# Patient Record
Sex: FEMALE | Race: WHITE | Employment: UNEMPLOYED | URBAN - METROPOLITAN AREA
[De-identification: names, ages, dates, MRNs, and addresses within clinical notes are randomized per-mention and may not be internally consistent; named-entity substitution may affect disease eponyms.]

---

## 2019-12-26 ENCOUNTER — OFFICE VISIT (OUTPATIENT)
Dept: FAMILY MEDICINE CLINIC | Facility: CLINIC | Age: 44
End: 2019-12-26
Payer: COMMERCIAL

## 2019-12-26 VITALS
SYSTOLIC BLOOD PRESSURE: 138 MMHG | DIASTOLIC BLOOD PRESSURE: 87 MMHG | HEART RATE: 80 BPM | WEIGHT: 155 LBS | BODY MASS INDEX: 24.33 KG/M2 | HEIGHT: 67 IN

## 2019-12-26 DIAGNOSIS — K21.9 GASTROESOPHAGEAL REFLUX DISEASE, ESOPHAGITIS PRESENCE NOT SPECIFIED: ICD-10-CM

## 2019-12-26 DIAGNOSIS — E55.9 VITAMIN D DEFICIENCY: ICD-10-CM

## 2019-12-26 DIAGNOSIS — Z12.31 SCREENING MAMMOGRAM, ENCOUNTER FOR: Primary | ICD-10-CM

## 2019-12-26 PROCEDURE — 99202 OFFICE O/P NEW SF 15 MIN: CPT | Performed by: FAMILY MEDICINE

## 2019-12-26 PROCEDURE — 99212 OFFICE O/P EST SF 10 MIN: CPT | Performed by: FAMILY MEDICINE

## 2019-12-26 RX ORDER — OMEPRAZOLE 20 MG/1
20 CAPSULE, DELAYED RELEASE ORAL
Qty: 90 CAPSULE | Refills: 0 | Status: SHIPPED | OUTPATIENT
Start: 2019-12-26

## 2019-12-26 RX ORDER — OMEPRAZOLE 20 MG/1
20 CAPSULE, DELAYED RELEASE ORAL
COMMUNITY
End: 2020-01-23

## 2019-12-26 RX ORDER — ERGOCALCIFEROL 1.25 MG/1
CAPSULE ORAL
Refills: 2 | COMMUNITY
Start: 2019-10-25 | End: 2021-05-28 | Stop reason: ALTCHOICE

## 2019-12-26 NOTE — PROGRESS NOTES
Blood pressure 138/87, pulse 80, height 5' 7\" (1.702 m), weight 155 lb (70.3 kg). Complaining of heartburn. Had an episode of chest pain when bending over with her child few days back.   She exercises on a treadmill walking 30 minutes at a time witho

## 2019-12-26 NOTE — PATIENT INSTRUCTIONS
GERD (Adult)    The esophagus is a tube that carries food from the mouth to the stomach. A valve (the LES, lower esophageal sphincter) at the lower end of the esophagus prevents stomach acid from flowing upward.  When this valve doesn't work properly, sto · If your symptoms occur during sleep, use a foam wedge to elevate your upper body (not just your head.) Or, place 4\" blocks under the head of your bed. Or use 2 bed risers under your bedframe.   Medicines  If needed, medicines can help relieve the symptom © 0152-5531 [employer] [address]. [address] [facility] [place], [address] [place]. All rights reserved. This information is not intended as a substitute for professional medical care. Always follow your healthcare professional's instructions.         Jeannie Rodrigues Date Last Reviewed: 7/1/2016  © 9825-1485 [employer]. [address] [facility] [place], [address]. All rights reserved. This information is not intended as a substitute for professional medical care.  Always follow your healthcare professional'

## 2020-01-03 ENCOUNTER — TELEPHONE (OUTPATIENT)
Dept: FAMILY MEDICINE CLINIC | Facility: CLINIC | Age: 45
End: 2020-01-03

## 2020-01-03 DIAGNOSIS — E78.00 HIGH CHOLESTEROL: Primary | ICD-10-CM

## 2020-01-03 NOTE — TELEPHONE ENCOUNTER
Result Notes for LIPID PANEL     Notes recorded by Don Montoya on 1/3/2020 at 10:28 AM CST  Please see TE from 01/03/20.  ------    Notes recorded by Javan Horvath DO on 1/2/2020 at 2:40 PM CST  Cholesterol elevated.  Referral to dietitian x2

## 2020-01-03 NOTE — TELEPHONE ENCOUNTER
[facility]. [facility], referral is pended. Please review and sign.

## 2020-01-03 NOTE — TELEPHONE ENCOUNTER
Miguel Faust DO  [facility]             Cholesterol elevated.  Referral to dietitian x2 diagnosis high cholesterol repeat 1 year.

## 2020-01-03 NOTE — TELEPHONE ENCOUNTER
Patient calling with questions about her lipid panel results. Patient wanted to know normal range for total cholesterol, LDL cholesterol, and triglycerides. Patient given information for normal ranges of labs mentioned above.    Also reviewed patient's

## 2020-01-03 NOTE — TELEPHONE ENCOUNTER
Spoke to pt, relayed test results and recommendations for dietician. Pt expressed understanding and agrees to plan. Number to dietician provided to pt.

## 2020-01-23 ENCOUNTER — OFFICE VISIT (OUTPATIENT)
Dept: FAMILY MEDICINE CLINIC | Facility: CLINIC | Age: 45
End: 2020-01-23
Payer: COMMERCIAL

## 2020-01-23 VITALS
DIASTOLIC BLOOD PRESSURE: 86 MMHG | HEIGHT: 67 IN | SYSTOLIC BLOOD PRESSURE: 126 MMHG | WEIGHT: 155 LBS | BODY MASS INDEX: 24.33 KG/M2 | HEART RATE: 70 BPM

## 2020-01-23 DIAGNOSIS — E78.00 HIGH CHOLESTEROL: ICD-10-CM

## 2020-01-23 DIAGNOSIS — Z12.31 SCREENING MAMMOGRAM, ENCOUNTER FOR: ICD-10-CM

## 2020-01-23 DIAGNOSIS — Z00.00 ROUTINE PHYSICAL EXAMINATION: Primary | ICD-10-CM

## 2020-01-23 DIAGNOSIS — E55.9 VITAMIN D DEFICIENCY: ICD-10-CM

## 2020-01-23 PROCEDURE — 90715 TDAP VACCINE 7 YRS/> IM: CPT | Performed by: FAMILY MEDICINE

## 2020-01-23 PROCEDURE — 99396 PREV VISIT EST AGE 40-64: CPT | Performed by: FAMILY MEDICINE

## 2020-01-23 PROCEDURE — 90471 IMMUNIZATION ADMIN: CPT | Performed by: FAMILY MEDICINE

## 2020-01-23 NOTE — PROGRESS NOTES
REASON FOR VISIT:    Nadia Andrade is a 38year old female who presents for an [address]. There is no problem list on file for this patient.     General Health     Domestic Abuse: No     CAGE:           Depression Screening (PHQ-2/PHQ-9 morning before breakfast. (For stomach acid) (Patient not taking: Reported on 1/23/2020 ) 90 capsule 0      MEDICAL INFORMATION:   Past Medical History:   Diagnosis Date   • Essential hypertension     hx of  high b/s 5yrs ago       Past Surgical History: no bruits  CHEST: no chest tenderness  BREAST: deferred   LUNGS: clear to auscultation  CARDIO: RRR without murmur  GI: good BS's, no masses, HSM or tenderness  :deferred  RECTAL: deferred  MUSCULOSKELETAL: back is not tender, FROM of the back  EXTREMITI

## 2020-01-29 ENCOUNTER — TELEPHONE (OUTPATIENT)
Dept: FAMILY MEDICINE CLINIC | Facility: CLINIC | Age: 45
End: 2020-01-29

## 2020-01-29 NOTE — TELEPHONE ENCOUNTER
Please contact patient I received information from outside radiology that she has been recalled for a left sided diagnostic mammogram.  Please make sure that this mammogram follow up has been scheduled.

## 2020-01-29 NOTE — TELEPHONE ENCOUNTER
Spoke to patient and states went today for additional imaging. [facility] gave them DR. Lindsay de guzman and will be sending information to the office.

## 2020-02-07 ENCOUNTER — OFFICE VISIT (OUTPATIENT)
Dept: SURGERY | Facility: CLINIC | Age: 45
End: 2020-02-07
Payer: COMMERCIAL

## 2020-02-07 VITALS
HEIGHT: 66 IN | WEIGHT: 151.5 LBS | BODY MASS INDEX: 24.35 KG/M2 | SYSTOLIC BLOOD PRESSURE: 120 MMHG | HEART RATE: 78 BPM | DIASTOLIC BLOOD PRESSURE: 80 MMHG

## 2020-02-07 DIAGNOSIS — R63.5 WEIGHT GAIN: ICD-10-CM

## 2020-02-07 DIAGNOSIS — E78.00 HYPERCHOLESTEREMIA: Primary | ICD-10-CM

## 2020-02-07 DIAGNOSIS — E78.1 HYPERTRIGLYCERIDEMIA: ICD-10-CM

## 2020-02-07 DIAGNOSIS — E55.9 VITAMIN D DEFICIENCY: ICD-10-CM

## 2020-02-07 DIAGNOSIS — R12 HEART BURN: ICD-10-CM

## 2020-02-07 PROCEDURE — 99204 OFFICE O/P NEW MOD 45 MIN: CPT | Performed by: NURSE PRACTITIONER

## 2020-02-07 NOTE — PROGRESS NOTES
[facility] Weight [facility] Consultation Note       Date of Consult:  2020    Patient:  Christian Buchanan  :      1975  MRN:      [id number]    Referring Provider: Dr. Devika Curtis       Chief Complaint:  Patient presents with:  Consult  Weight Manag on file        Inability: Not on file      Transportation needs:        Medical: Not on file        Non-medical: Not on file    Tobacco Use      Smoking status: Never Smoker      Smokeless tobacco: Never Used    Substance and Sexual Activity      Alcohol u -  Portion sizes: -  Binge: BED 7-  Emotional: -  Depression: Total PHQ Score: 2  Grazing: +  Sweet tooth: +  Crunchy/salty: +  Etoh: Rare    Soda Drinker: No Would be very rare    Sports Drinks:  No    Juice:  No      Average daily fruit/vegetable serving Grossly normal    ASSESSMENT     HYPERCHOLESTEROLEMIA WITH HYPERTRIGLCYCERIDEMIA:  Elevated.      Lab Results   Component Value Date/Time    CHOLEST 206 (H) 12/30/2019 11:14 AM     (H) 12/30/2019 11:14 AM    HDL 53 12/30/2019 11:14 AM    TRIG 190 (H) and healthy fats, and meats/seafood without hormones, steroids, or antibiotics. Avoid processed, poor quality carbohydrates, refined grains, flour, sugar. Goals for next month:  1. Keep a food log. Meet with ND for additional support as needed.    2.

## 2020-02-07 NOTE — PATIENT INSTRUCTIONS
Nutrition:    Discussed the importance of whole food, plant powered, minimally to non-processed diet rich in fruits, vegetables, whole grains and healthy fats, and meats/seafood without hormones, steroids, or antibiotics.  Slow carbohydrates/low glycemic in

## 2021-04-27 ENCOUNTER — TELEPHONE (OUTPATIENT)
Dept: FAMILY MEDICINE CLINIC | Facility: CLINIC | Age: 46
End: 2021-04-27

## 2021-05-28 ENCOUNTER — LAB ENCOUNTER (OUTPATIENT)
Dept: LAB | Age: 46
End: 2021-05-28
Attending: FAMILY MEDICINE
Payer: COMMERCIAL

## 2021-05-28 ENCOUNTER — OFFICE VISIT (OUTPATIENT)
Dept: FAMILY MEDICINE CLINIC | Facility: CLINIC | Age: 46
End: 2021-05-28
Payer: COMMERCIAL

## 2021-05-28 VITALS
BODY MASS INDEX: 23.46 KG/M2 | DIASTOLIC BLOOD PRESSURE: 82 MMHG | SYSTOLIC BLOOD PRESSURE: 121 MMHG | HEART RATE: 78 BPM | HEIGHT: 66 IN | WEIGHT: 146 LBS

## 2021-05-28 DIAGNOSIS — Z80.0 FAMILY HISTORY OF COLON CANCER: ICD-10-CM

## 2021-05-28 DIAGNOSIS — E78.5 HYPERLIPIDEMIA, UNSPECIFIED HYPERLIPIDEMIA TYPE: ICD-10-CM

## 2021-05-28 DIAGNOSIS — Z00.00 ROUTINE PHYSICAL EXAMINATION: Primary | ICD-10-CM

## 2021-05-28 PROCEDURE — 3008F BODY MASS INDEX DOCD: CPT | Performed by: FAMILY MEDICINE

## 2021-05-28 PROCEDURE — 3074F SYST BP LT 130 MM HG: CPT | Performed by: FAMILY MEDICINE

## 2021-05-28 PROCEDURE — 80053 COMPREHEN METABOLIC PANEL: CPT

## 2021-05-28 PROCEDURE — 84443 ASSAY THYROID STIM HORMONE: CPT

## 2021-05-28 PROCEDURE — 3079F DIAST BP 80-89 MM HG: CPT | Performed by: FAMILY MEDICINE

## 2021-05-28 PROCEDURE — 99396 PREV VISIT EST AGE 40-64: CPT | Performed by: FAMILY MEDICINE

## 2021-05-28 PROCEDURE — 36415 COLL VENOUS BLD VENIPUNCTURE: CPT

## 2021-05-28 PROCEDURE — 80061 LIPID PANEL: CPT

## 2021-05-28 RX ORDER — MULTIVITAMIN
TABLET ORAL DAILY
COMMUNITY

## 2021-05-28 NOTE — PROGRESS NOTES
REASON FOR VISIT:    Angely Beatty is a 54year old female who presents for an [address].         Patient Active Problem List:     Hypercholesteremia     Weight gain     Heart burn     Vitamin D deficiency     Hypertriglyceridemia    General H Take by mouth daily. • Ascorbic Acid (VITAMIN C OR) Take by mouth daily.      • omeprazole 20 MG Oral Capsule Delayed Release Take 1 capsule (20 mg total) by mouth every morning before breakfast. (For stomach acid) (Patient not taking: Reported on 1/23/ menstrual period was 05/15/2021 (exact date).     GENERAL: well developed, well nourished, in no apparent distress   SKIN: no rashes, no suspicious lesions  HEENT: atraumatic, normocephalic, ears and throat are clear  EYES:PERRLA, EOMI, conjunctiva are yuan risk   Td/Tdap once then every 10 years   HPV Males 11-21   Zoster (Shingles) 60 and older: one dose   Varicella 2 doses if not immune   MMR 1-2 doses if born after 1957 and not immune

## 2021-08-12 ENCOUNTER — TELEPHONE (OUTPATIENT)
Dept: FAMILY MEDICINE CLINIC | Facility: CLINIC | Age: 46
End: 2021-08-12

## 2021-08-12 NOTE — TELEPHONE ENCOUNTER
Form ready for  [facility]  , pt noitifed Benzoyl Peroxide Counseling: Patient counseled that medicine may cause skin irritation and bleach clothing.  In the event of skin irritation, the patient was advised to reduce the amount of the drug applied or use it less frequently.   The patient verbalized understanding of the proper use and possible adverse effects of benzoyl peroxide.  All of the patient's questions and concerns were addressed.

## 2021-08-12 NOTE — TELEPHONE ENCOUNTER
Pt stopped by [place] office form needed for work. Please call pt when ready. Form placed in Drs folder.

## 2022-03-11 ENCOUNTER — HOSPITAL ENCOUNTER (OUTPATIENT)
Age: 47
Discharge: HOME | End: 2022-03-11
Payer: COMMERCIAL

## 2022-03-11 VITALS
RESPIRATION RATE: 17 BRPM | HEART RATE: 78 BPM | DIASTOLIC BLOOD PRESSURE: 83 MMHG | TEMPERATURE: 98 F | OXYGEN SATURATION: 99 % | SYSTOLIC BLOOD PRESSURE: 135 MMHG

## 2022-03-11 DIAGNOSIS — N93.9 VAGINAL BLEEDING: Primary | ICD-10-CM

## 2022-03-11 LAB
#MXD IC: 0.4 X10ˆ3/UL (ref 0.1–1)
B-HCG UR QL: NEGATIVE
BILIRUB UR QL STRIP: NEGATIVE
BUN BLD-MCNC: 19 MG/DL (ref 7–18)
CHLORIDE BLD-SCNC: 103 MMOL/L (ref 98–112)
CLARITY UR: CLEAR
CO2 BLD-SCNC: 27 MMOL/L (ref 21–32)
COLOR UR: YELLOW
CREAT BLD-MCNC: 0.7 MG/DL
GLUCOSE BLD-MCNC: 96 MG/DL (ref 70–99)
GLUCOSE UR STRIP-MCNC: NEGATIVE MG/DL
HCT VFR BLD AUTO: 37.2 %
HCT VFR BLD CALC: 41 %
HGB BLD-MCNC: 12.7 G/DL
ISTAT IONIZED CALCIUM FOR CHEM 8: 1.24 MMOL/L (ref 1.12–1.32)
KETONES UR STRIP-MCNC: NEGATIVE MG/DL
LEUKOCYTE ESTERASE UR QL STRIP: NEGATIVE
LYMPHOCYTES # BLD AUTO: 1.8 X10ˆ3/UL (ref 1–4)
LYMPHOCYTES NFR BLD AUTO: 25.9 %
MCH RBC QN AUTO: 30.4 PG (ref 26–34)
MCHC RBC AUTO-ENTMCNC: 34.1 G/DL (ref 31–37)
MCV RBC AUTO: 89 FL (ref 80–100)
MIXED CELL %: 6 %
NEUTROPHILS # BLD AUTO: 4.7 X10ˆ3/UL (ref 1.5–7.7)
NEUTROPHILS NFR BLD AUTO: 68.1 %
NITRITE UR QL STRIP: NEGATIVE
PH UR STRIP: 5.5 [PH]
PLATELET # BLD AUTO: 261 X10ˆ3/UL (ref 150–450)
RBC # BLD AUTO: 4.18 X10ˆ6/UL
SODIUM BLD-SCNC: 140 MMOL/L (ref 136–145)
SP GR UR STRIP: 1.01
UROBILINOGEN UR STRIP-ACNC: <2 MG/DL
WBC # BLD AUTO: 6.9 X10ˆ3/UL (ref 4–11)

## 2022-03-11 PROCEDURE — 81025 URINE PREGNANCY TEST: CPT

## 2022-03-11 PROCEDURE — 80047 BASIC METABLC PNL IONIZED CA: CPT

## 2022-03-11 PROCEDURE — 99213 OFFICE O/P EST LOW 20 MIN: CPT

## 2022-03-11 PROCEDURE — 85025 COMPLETE CBC W/AUTO DIFF WBC: CPT | Performed by: NURSE PRACTITIONER

## 2022-03-11 PROCEDURE — 36415 COLL VENOUS BLD VENIPUNCTURE: CPT

## 2022-03-11 PROCEDURE — 99203 OFFICE O/P NEW LOW 30 MIN: CPT

## 2022-03-11 PROCEDURE — 81002 URINALYSIS NONAUTO W/O SCOPE: CPT

## 2022-03-12 NOTE — ED INITIAL ASSESSMENT (HPI)
Patient reports starting mensuration on 3/2 states she is currently on the 10th day of her period. States she is bleeding heavy and has noted clots larger than a quarter. States she did not have a period in February and had a negative ucg test.  States she is currently changing her pads every 3-4 hours. Noted increasing bleeding at night and in the morning.

## 2022-04-15 ENCOUNTER — LAB ENCOUNTER (OUTPATIENT)
Dept: LAB | Age: 47
End: 2022-04-15
Attending: OBSTETRICS & GYNECOLOGY
Payer: COMMERCIAL

## 2022-04-15 DIAGNOSIS — N93.9 ABNORMAL UTERINE AND VAGINAL BLEEDING, UNSPECIFIED: Primary | ICD-10-CM

## 2022-04-15 LAB — TSI SER-ACNC: 0.9 MIU/ML (ref 0.36–3.74)

## 2022-04-15 PROCEDURE — 36415 COLL VENOUS BLD VENIPUNCTURE: CPT

## 2022-04-15 PROCEDURE — 84443 ASSAY THYROID STIM HORMONE: CPT

## 2023-04-07 ENCOUNTER — OFFICE VISIT (OUTPATIENT)
Dept: FAMILY MEDICINE CLINIC | Facility: CLINIC | Age: 48
End: 2023-04-07

## 2023-04-07 ENCOUNTER — LAB ENCOUNTER (OUTPATIENT)
Dept: LAB | Facility: REFERENCE LAB | Age: 48
End: 2023-04-07
Attending: FAMILY MEDICINE
Payer: COMMERCIAL

## 2023-04-07 VITALS
SYSTOLIC BLOOD PRESSURE: 126 MMHG | HEIGHT: 66 IN | WEIGHT: 159 LBS | BODY MASS INDEX: 25.55 KG/M2 | HEART RATE: 80 BPM | DIASTOLIC BLOOD PRESSURE: 86 MMHG

## 2023-04-07 DIAGNOSIS — Z12.31 SCREENING MAMMOGRAM, ENCOUNTER FOR: ICD-10-CM

## 2023-04-07 DIAGNOSIS — E78.00 HYPERCHOLESTEREMIA: ICD-10-CM

## 2023-04-07 DIAGNOSIS — Z12.11 ENCOUNTER FOR SCREENING COLONOSCOPY: ICD-10-CM

## 2023-04-07 DIAGNOSIS — E55.9 VITAMIN D DEFICIENCY: ICD-10-CM

## 2023-04-07 DIAGNOSIS — Z00.00 ROUTINE PHYSICAL EXAMINATION: Primary | ICD-10-CM

## 2023-04-07 LAB
ALBUMIN SERPL-MCNC: 4 G/DL (ref 3.4–5)
ALBUMIN/GLOB SERPL: 1.1 {RATIO} (ref 1–2)
ALP LIVER SERPL-CCNC: 72 U/L
ALT SERPL-CCNC: 32 U/L
ANION GAP SERPL CALC-SCNC: 7 MMOL/L (ref 0–18)
AST SERPL-CCNC: 17 U/L (ref 15–37)
BILIRUB SERPL-MCNC: 0.8 MG/DL (ref 0.1–2)
BUN BLD-MCNC: 14 MG/DL (ref 7–18)
BUN/CREAT SERPL: 17.9 (ref 10–20)
CALCIUM BLD-MCNC: 10 MG/DL (ref 8.5–10.1)
CHLORIDE SERPL-SCNC: 104 MMOL/L (ref 98–112)
CHOLEST SERPL-MCNC: 201 MG/DL (ref ?–200)
CO2 SERPL-SCNC: 27 MMOL/L (ref 21–32)
CREAT BLD-MCNC: 0.78 MG/DL
FASTING PATIENT LIPID ANSWER: YES
FASTING STATUS PATIENT QL REPORTED: YES
GFR SERPLBLD BASED ON 1.73 SQ M-ARVRAT: 94 ML/MIN/1.73M2 (ref 60–?)
GLOBULIN PLAS-MCNC: 3.7 G/DL (ref 2.8–4.4)
GLUCOSE BLD-MCNC: 86 MG/DL (ref 70–99)
HDLC SERPL-MCNC: 52 MG/DL (ref 40–59)
LDLC SERPL CALC-MCNC: 131 MG/DL (ref ?–100)
NONHDLC SERPL-MCNC: 149 MG/DL (ref ?–130)
OSMOLALITY SERPL CALC.SUM OF ELEC: 286 MOSM/KG (ref 275–295)
POTASSIUM SERPL-SCNC: 3.8 MMOL/L (ref 3.5–5.1)
PROT SERPL-MCNC: 7.7 G/DL (ref 6.4–8.2)
SODIUM SERPL-SCNC: 138 MMOL/L (ref 136–145)
TRIGL SERPL-MCNC: 98 MG/DL (ref 30–149)
TSI SER-ACNC: 1.04 MIU/ML (ref 0.36–3.74)
VIT D+METAB SERPL-MCNC: 41.9 NG/ML (ref 30–100)
VLDLC SERPL CALC-MCNC: 18 MG/DL (ref 0–30)

## 2023-04-07 PROCEDURE — 84443 ASSAY THYROID STIM HORMONE: CPT

## 2023-04-07 PROCEDURE — 80053 COMPREHEN METABOLIC PANEL: CPT

## 2023-04-07 PROCEDURE — 3074F SYST BP LT 130 MM HG: CPT | Performed by: FAMILY MEDICINE

## 2023-04-07 PROCEDURE — 3079F DIAST BP 80-89 MM HG: CPT | Performed by: FAMILY MEDICINE

## 2023-04-07 PROCEDURE — 36415 COLL VENOUS BLD VENIPUNCTURE: CPT

## 2023-04-07 PROCEDURE — 82306 VITAMIN D 25 HYDROXY: CPT

## 2023-04-07 PROCEDURE — 99396 PREV VISIT EST AGE 40-64: CPT | Performed by: FAMILY MEDICINE

## 2023-04-07 PROCEDURE — 3008F BODY MASS INDEX DOCD: CPT | Performed by: FAMILY MEDICINE

## 2023-04-07 PROCEDURE — 80061 LIPID PANEL: CPT

## 2023-04-07 RX ORDER — IBUPROFEN 800 MG/1
800 TABLET ORAL 3 TIMES DAILY PRN
COMMUNITY
Start: 2023-03-22 | End: 2023-04-07

## 2023-04-07 RX ORDER — MEDROXYPROGESTERONE ACETATE 10 MG/1
TABLET ORAL
COMMUNITY

## 2023-04-07 RX ORDER — LEVONORGESTREL 52 MG/1
INTRAUTERINE DEVICE INTRAUTERINE
COMMUNITY

## 2023-04-19 ENCOUNTER — TELEPHONE (OUTPATIENT)
Dept: FAMILY MEDICINE CLINIC | Facility: CLINIC | Age: 48
End: 2023-04-19

## 2023-04-21 ENCOUNTER — HOSPITAL ENCOUNTER (OUTPATIENT)
Dept: CT IMAGING | Age: 48
Discharge: HOME | End: 2023-04-21
Attending: FAMILY MEDICINE

## 2023-04-21 DIAGNOSIS — E78.00 HYPERCHOLESTEREMIA: ICD-10-CM

## 2023-08-03 ENCOUNTER — MED REC SCAN ONLY (OUTPATIENT)
Dept: FAMILY MEDICINE CLINIC | Facility: CLINIC | Age: 48
End: 2023-08-03

## 2025-06-16 ENCOUNTER — OFFICE VISIT (OUTPATIENT)
Dept: FAMILY MEDICINE CLINIC | Facility: CLINIC | Age: 50
End: 2025-06-16
Payer: COMMERCIAL

## 2025-06-16 ENCOUNTER — LAB ENCOUNTER (OUTPATIENT)
Dept: LAB | Age: 50
End: 2025-06-16
Attending: PHYSICIAN ASSISTANT
Payer: COMMERCIAL

## 2025-06-16 VITALS
WEIGHT: 162 LBS | HEIGHT: 66 IN | DIASTOLIC BLOOD PRESSURE: 82 MMHG | BODY MASS INDEX: 26.03 KG/M2 | SYSTOLIC BLOOD PRESSURE: 124 MMHG | HEART RATE: 73 BPM

## 2025-06-16 DIAGNOSIS — Z12.11 SCREENING FOR MALIGNANT NEOPLASM OF COLON: ICD-10-CM

## 2025-06-16 DIAGNOSIS — Z00.00 WELLNESS EXAMINATION: Primary | ICD-10-CM

## 2025-06-16 DIAGNOSIS — Z12.31 ENCOUNTER FOR SCREENING MAMMOGRAM FOR BREAST CANCER: ICD-10-CM

## 2025-06-16 DIAGNOSIS — R73.09 ABNORMAL GLUCOSE: ICD-10-CM

## 2025-06-16 DIAGNOSIS — Z00.00 WELLNESS EXAMINATION: ICD-10-CM

## 2025-06-16 DIAGNOSIS — Z12.4 SCREENING FOR CERVICAL CANCER: ICD-10-CM

## 2025-06-16 PROBLEM — R12 HEART BURN: Status: RESOLVED | Noted: 2020-02-07 | Resolved: 2025-06-16

## 2025-06-16 PROBLEM — E55.9 VITAMIN D DEFICIENCY: Status: RESOLVED | Noted: 2020-02-07 | Resolved: 2025-06-16

## 2025-06-16 PROBLEM — R63.5 WEIGHT GAIN: Status: RESOLVED | Noted: 2020-02-07 | Resolved: 2025-06-16

## 2025-06-16 LAB
ALBUMIN SERPL-MCNC: 5.1 G/DL (ref 3.2–4.8)
ALBUMIN/GLOB SERPL: 1.9 {RATIO} (ref 1–2)
ALP LIVER SERPL-CCNC: 71 U/L (ref 39–100)
ALT SERPL-CCNC: 30 U/L (ref 10–49)
ANION GAP SERPL CALC-SCNC: 7 MMOL/L (ref 0–18)
AST SERPL-CCNC: 25 U/L (ref ?–34)
BASOPHILS # BLD AUTO: 0.02 X10(3) UL (ref 0–0.2)
BASOPHILS NFR BLD AUTO: 0.4 %
BILIRUB SERPL-MCNC: 0.7 MG/DL (ref 0.3–1.2)
BUN BLD-MCNC: 17 MG/DL (ref 9–23)
BUN/CREAT SERPL: 18.7 (ref 10–20)
CALCIUM BLD-MCNC: 9.4 MG/DL (ref 8.7–10.4)
CHLORIDE SERPL-SCNC: 105 MMOL/L (ref 98–112)
CHOLEST SERPL-MCNC: 199 MG/DL (ref ?–200)
CO2 SERPL-SCNC: 26 MMOL/L (ref 21–32)
CREAT BLD-MCNC: 0.91 MG/DL (ref 0.55–1.02)
DEPRECATED RDW RBC AUTO: 40.5 FL (ref 35.1–46.3)
EGFRCR SERPLBLD CKD-EPI 2021: 77 ML/MIN/1.73M2 (ref 60–?)
EOSINOPHIL # BLD AUTO: 0.1 X10(3) UL (ref 0–0.7)
EOSINOPHIL NFR BLD AUTO: 2.2 %
ERYTHROCYTE [DISTWIDTH] IN BLOOD BY AUTOMATED COUNT: 12.2 % (ref 11–15)
EST. AVERAGE GLUCOSE BLD GHB EST-MCNC: 114 MG/DL (ref 68–126)
FASTING PATIENT LIPID ANSWER: YES
FASTING STATUS PATIENT QL REPORTED: YES
GLOBULIN PLAS-MCNC: 2.7 G/DL (ref 2–3.5)
GLUCOSE BLD-MCNC: 101 MG/DL (ref 70–99)
HBA1C MFR BLD: 5.6 % (ref ?–5.7)
HCT VFR BLD AUTO: 41.3 % (ref 35–48)
HDLC SERPL-MCNC: 48 MG/DL (ref 40–59)
HGB BLD-MCNC: 14.3 G/DL (ref 12–16)
IMM GRANULOCYTES # BLD AUTO: 0 X10(3) UL (ref 0–1)
IMM GRANULOCYTES NFR BLD: 0 %
LDLC SERPL CALC-MCNC: 133 MG/DL (ref ?–100)
LYMPHOCYTES # BLD AUTO: 1.42 X10(3) UL (ref 1–4)
LYMPHOCYTES NFR BLD AUTO: 30.6 %
MCH RBC QN AUTO: 31.3 PG (ref 26–34)
MCHC RBC AUTO-ENTMCNC: 34.6 G/DL (ref 31–37)
MCV RBC AUTO: 90.4 FL (ref 80–100)
MONOCYTES # BLD AUTO: 0.23 X10(3) UL (ref 0.1–1)
MONOCYTES NFR BLD AUTO: 5 %
NEUTROPHILS # BLD AUTO: 2.87 X10 (3) UL (ref 1.5–7.7)
NEUTROPHILS # BLD AUTO: 2.87 X10(3) UL (ref 1.5–7.7)
NEUTROPHILS NFR BLD AUTO: 61.8 %
NONHDLC SERPL-MCNC: 151 MG/DL (ref ?–130)
OSMOLALITY SERPL CALC.SUM OF ELEC: 288 MOSM/KG (ref 275–295)
PLATELET # BLD AUTO: 243 10(3)UL (ref 150–450)
POTASSIUM SERPL-SCNC: 4.1 MMOL/L (ref 3.5–5.1)
PROT SERPL-MCNC: 7.8 G/DL (ref 5.7–8.2)
RBC # BLD AUTO: 4.57 X10(6)UL (ref 3.8–5.3)
SODIUM SERPL-SCNC: 138 MMOL/L (ref 136–145)
TRIGL SERPL-MCNC: 97 MG/DL (ref 30–149)
TSI SER-ACNC: 1.14 UIU/ML (ref 0.55–4.78)
VLDLC SERPL CALC-MCNC: 18 MG/DL (ref 0–30)
WBC # BLD AUTO: 4.6 X10(3) UL (ref 4–11)

## 2025-06-16 PROCEDURE — 86480 TB TEST CELL IMMUN MEASURE: CPT

## 2025-06-16 PROCEDURE — 83036 HEMOGLOBIN GLYCOSYLATED A1C: CPT

## 2025-06-16 PROCEDURE — 36415 COLL VENOUS BLD VENIPUNCTURE: CPT

## 2025-06-16 PROCEDURE — 85025 COMPLETE CBC W/AUTO DIFF WBC: CPT

## 2025-06-16 PROCEDURE — 80061 LIPID PANEL: CPT

## 2025-06-16 PROCEDURE — 80053 COMPREHEN METABOLIC PANEL: CPT

## 2025-06-16 PROCEDURE — 84443 ASSAY THYROID STIM HORMONE: CPT

## 2025-06-16 NOTE — PROGRESS NOTES
HPI:   Madeleine Lorenzo is a 49 year old female who presents for an Annual Health Visit.   The patient is currently feeling well. The patient denies chest pain, SOB, N/V/C/D, fever, dizziness, syncope, and abdominal pain. There are no other concerns today.      Allergies:   Allergies[1]    CURRENT MEDICATIONS   Current Medications[2]   HISTORICAL INFORMATION   Past Medical History[3]   Past Surgical History[4]   Family History[5]   SOCIAL HISTORY   Short Social Hx on File[6]  Social History     Social History Narrative    Not on file        REVIEW OF SYSTEMS:     Review of Systems   Constitutional: Negative.    HENT: Negative.     Eyes: Negative.    Respiratory: Negative.     Cardiovascular: Negative.    Gastrointestinal: Negative.    Genitourinary: Negative.    Musculoskeletal: Negative.    Skin: Negative.    Neurological: Negative.    Psychiatric/Behavioral: Negative.           EXAM:   /82   Pulse 73   Ht 5' 6\" (1.676 m)   Wt 162 lb (73.5 kg)   BMI 26.15 kg/m²    Wt Readings from Last 6 Encounters:   06/16/25 162 lb (73.5 kg)   04/07/23 159 lb (72.1 kg)   05/28/21 146 lb (66.2 kg)   02/07/20 151 lb 8 oz (68.7 kg)   01/23/20 155 lb (70.3 kg)   12/26/19 155 lb (70.3 kg)     Body mass index is 26.15 kg/m².    Physical Exam  Vitals reviewed.   Constitutional:       Appearance: She is well-developed.   HENT:      Head: Normocephalic and atraumatic.      Right Ear: Tympanic membrane, ear canal and external ear normal. There is no impacted cerumen.      Left Ear: Tympanic membrane, ear canal and external ear normal. There is no impacted cerumen.      Nose: Nose normal.      Mouth/Throat:      Mouth: Mucous membranes are moist.      Pharynx: Oropharynx is clear. No oropharyngeal exudate or posterior oropharyngeal erythema.   Eyes:      General:         Right eye: No discharge.         Left eye: No discharge.      Conjunctiva/sclera: Conjunctivae normal.   Cardiovascular:      Rate and Rhythm: Normal rate and  regular rhythm.      Heart sounds: Normal heart sounds.   Pulmonary:      Effort: Pulmonary effort is normal.      Breath sounds: Normal breath sounds.   Abdominal:      General: Abdomen is flat. Bowel sounds are normal. There is no distension.      Palpations: Abdomen is soft.      Tenderness: There is no abdominal tenderness. There is no right CVA tenderness or left CVA tenderness.   Genitourinary:     Vagina: Normal.   Musculoskeletal:         General: Normal range of motion.      Cervical back: Normal range of motion and neck supple.   Skin:     Findings: No rash.   Neurological:      Mental Status: She is alert and oriented to person, place, and time.   Psychiatric:         Mood and Affect: Mood normal.         Behavior: Behavior normal.         Thought Content: Thought content normal.         Judgment: Judgment normal.          ASSESSMENT AND PLAN:   Madeleine was seen today for routine physical.    Diagnoses and all orders for this visit:    Wellness examination  -     CBC With Differential With Platelet; Future  -     Comp Metabolic Panel (14); Future  -     Lipid Panel; Future  -     TSH W Reflex To Free T4; Future  -     Prevnar 20 (PCV20) [39235]  -     Quantiferon TB Plus; Future  Overall health discussed, exercise/activity appropriate for age and health status, heathy diety, preventive care, and upcoming screening discussed. Routine labs ordered.        There are no Patient Instructions on file for this visit.    The patient indicates understanding of these issues and agrees to the plan.    Problem List:  Problem List[7]    Willie Hernandez PA-C  6/16/2025  9:06 AM               [1] No Known Allergies  [2]   Current Outpatient Medications   Medication Sig Dispense Refill    medroxyPROGESTERone Acetate (PROVERA) 10 MG Oral Tab Provera 10 mg tablet   Take 1 tablet every day by oral route for 10 days.      Levonorgestrel (MIRENA, 52 MG,) 20 MCG/DAY Intrauterine IUD Mirena 21 mcg/24 hours (8 yrs) 52 mg  intrauterine device   Take 1 device by intrauterine route.      Multiple Vitamin (MULTI-VITAMIN DAILY) Oral Tab Take by mouth daily.      TURMERIC OR Take by mouth daily.      Ascorbic Acid (VITAMIN C OR) Take by mouth daily.     [3]   Past Medical History:   Essential hypertension    hx of  high b/s 5yrs ago    [4]   Past Surgical History:  Procedure Laterality Date    Cholecystectomy  2014    Colonoscopy  2017    family HX of Colon cancer    [5]   Family History  Problem Relation Age of Onset    Heart Disorder Father     Diabetes Mother     Cancer Maternal Grandmother     Heart Disorder Paternal Grandfather     Cancer Sister         COLONOSCOPY    Heart Disorder Paternal Uncle    [6]   Social History  Socioeconomic History    Marital status:    Tobacco Use    Smoking status: Never    Smokeless tobacco: Never   Vaping Use    Vaping status: Never Used   Substance and Sexual Activity    Alcohol use: Yes     Comment: rarely    Drug use: Never     Social Drivers of Health     Food Insecurity: Low Risk  (8/12/2024)    [facility]     Within the past 12 months, the food you bought just didn't last and you didn't have money to get more.: 3     Within the past 12 months, you worried that your food would run out before you got money to buy more.: 3   Housing Stability: Not At Risk (8/12/2024)    [facility] Housing     What is your living situation today?: I have a steady place to live     Think about the place you live. Do you have problems with any of the following?: None of the above   [7]   Patient Active Problem List  Diagnosis    Hypercholesteremia    Weight gain    Heart burn    Vitamin D deficiency    Hypertriglyceridemia

## 2025-06-18 ENCOUNTER — PATIENT MESSAGE (OUTPATIENT)
Dept: FAMILY MEDICINE CLINIC | Facility: CLINIC | Age: 50
End: 2025-06-18

## 2025-06-18 LAB
M TB IFN-G CD4+ T-CELLS BLD-ACNC: 0.04 IU/ML
M TB TUBERC IFN-G BLD QL: NEGATIVE
M TB TUBERC IGNF/MITOGEN IGNF CONTROL: 3.77 IU/ML
QFT TB1 AG MINUS NIL: 0 IU/ML
QFT TB2 AG MINUS NIL: -0.01 IU/ML

## (undated) NOTE — Clinical Note
Geo,I saw Azalia Rachel in clinic today for weight loss/management. I have recommended lifestyle/behavioral modifications to help manage her high cholesterol. She will follow up with our nutritionist if she requires additional nutrition support.  In addition, I

## (undated) NOTE — LETTER
04/29/20        Lexi Pineda  [address]      Dear Maurilio Cap,    [facility] records indicate that you have outstanding lab work and or testing that was ordered for you and has not yet been completed:  Orders Placed This Encounter